# Patient Record
Sex: MALE | Race: WHITE | Employment: FULL TIME | URBAN - METROPOLITAN AREA
[De-identification: names, ages, dates, MRNs, and addresses within clinical notes are randomized per-mention and may not be internally consistent; named-entity substitution may affect disease eponyms.]

---

## 2017-09-12 ENCOUNTER — OFFICE VISIT (OUTPATIENT)
Dept: FAMILY MEDICINE CLINIC | Age: 47
End: 2017-09-12
Payer: COMMERCIAL

## 2017-09-12 VITALS
SYSTOLIC BLOOD PRESSURE: 116 MMHG | BODY MASS INDEX: 28.4 KG/M2 | HEART RATE: 78 BPM | RESPIRATION RATE: 16 BRPM | WEIGHT: 228.4 LBS | HEIGHT: 75 IN | DIASTOLIC BLOOD PRESSURE: 78 MMHG

## 2017-09-12 DIAGNOSIS — D22.9 MULTIPLE NEVI: ICD-10-CM

## 2017-09-12 DIAGNOSIS — Z00.00 ENCOUNTER FOR MEDICAL EXAMINATION TO ESTABLISH CARE: Primary | ICD-10-CM

## 2017-09-12 DIAGNOSIS — E78.00 PURE HYPERCHOLESTEROLEMIA: ICD-10-CM

## 2017-09-12 DIAGNOSIS — L82.1 SEBORRHEIC KERATOSES: ICD-10-CM

## 2017-09-12 PROCEDURE — 99203 OFFICE O/P NEW LOW 30 MIN: CPT | Performed by: INTERNAL MEDICINE

## 2017-09-12 ASSESSMENT — PATIENT HEALTH QUESTIONNAIRE - PHQ9
2. FEELING DOWN, DEPRESSED OR HOPELESS: 0
SUM OF ALL RESPONSES TO PHQ9 QUESTIONS 1 & 2: 0
1. LITTLE INTEREST OR PLEASURE IN DOING THINGS: 0
SUM OF ALL RESPONSES TO PHQ QUESTIONS 1-9: 0

## 2017-09-12 ASSESSMENT — ENCOUNTER SYMPTOMS
VOMITING: 0
CHEST TIGHTNESS: 0
DIARRHEA: 0
SHORTNESS OF BREATH: 0
COUGH: 0
ABDOMINAL PAIN: 0

## 2017-09-25 ENCOUNTER — HOSPITAL ENCOUNTER (OUTPATIENT)
Facility: CLINIC | Age: 47
Discharge: HOME | End: 2017-09-25
Payer: COMMERCIAL

## 2017-09-25 DIAGNOSIS — R73.01 ELEVATED FASTING GLUCOSE: Primary | ICD-10-CM

## 2017-09-25 DIAGNOSIS — E78.00 PURE HYPERCHOLESTEROLEMIA: Primary | ICD-10-CM

## 2017-09-25 DIAGNOSIS — Z00.00 ENCOUNTER FOR MEDICAL EXAMINATION TO ESTABLISH CARE: ICD-10-CM

## 2017-09-25 DIAGNOSIS — R73.01 ELEVATED FASTING BLOOD SUGAR: ICD-10-CM

## 2017-09-25 DIAGNOSIS — E78.00 PURE HYPERCHOLESTEROLEMIA: ICD-10-CM

## 2017-09-25 LAB
ALBUMIN SERPL-MCNC: 4.7 G/DL (ref 3.5–5.2)
ALBUMIN/GLOBULIN RATIO: 1.5 (ref 1–2.5)
ALP BLD-CCNC: 73 U/L (ref 40–129)
ALT SERPL-CCNC: 45 U/L (ref 5–41)
ANION GAP SERPL CALCULATED.3IONS-SCNC: 14 MMOL/L (ref 9–17)
AST SERPL-CCNC: 27 U/L
BILIRUB SERPL-MCNC: 0.47 MG/DL (ref 0.3–1.2)
BUN BLDV-MCNC: 17 MG/DL (ref 6–20)
BUN/CREAT BLD: ABNORMAL (ref 9–20)
CALCIUM SERPL-MCNC: 9.6 MG/DL (ref 8.6–10.4)
CHLORIDE BLD-SCNC: 103 MMOL/L (ref 98–107)
CHOLESTEROL/HDL RATIO: 5.6
CHOLESTEROL: 280 MG/DL
CO2: 23 MMOL/L (ref 20–31)
CREAT SERPL-MCNC: 1.02 MG/DL (ref 0.7–1.2)
ESTIMATED AVERAGE GLUCOSE: 108 MG/DL
GFR AFRICAN AMERICAN: >60 ML/MIN
GFR NON-AFRICAN AMERICAN: >60 ML/MIN
GFR SERPL CREATININE-BSD FRML MDRD: ABNORMAL ML/MIN/{1.73_M2}
GFR SERPL CREATININE-BSD FRML MDRD: ABNORMAL ML/MIN/{1.73_M2}
GLUCOSE BLD-MCNC: 112 MG/DL (ref 70–99)
HBA1C MFR BLD: 5.4 % (ref 4–6)
HCT VFR BLD CALC: 47 % (ref 41–53)
HDLC SERPL-MCNC: 50 MG/DL
HEMOGLOBIN: 15.8 G/DL (ref 13.5–17.5)
LDL CHOLESTEROL: 201 MG/DL (ref 0–130)
MCH RBC QN AUTO: 29.4 PG (ref 26–34)
MCHC RBC AUTO-ENTMCNC: 33.7 G/DL (ref 31–37)
MCV RBC AUTO: 87.2 FL (ref 80–100)
PDW BLD-RTO: 14.4 % (ref 12.5–15.4)
PLATELET # BLD: 248 K/UL (ref 140–450)
PMV BLD AUTO: 10.8 FL (ref 6–12)
POTASSIUM SERPL-SCNC: 4.4 MMOL/L (ref 3.7–5.3)
RBC # BLD: 5.39 M/UL (ref 4.5–5.9)
SODIUM BLD-SCNC: 140 MMOL/L (ref 135–144)
TOTAL PROTEIN: 7.9 G/DL (ref 6.4–8.3)
TRIGL SERPL-MCNC: 145 MG/DL
VLDLC SERPL CALC-MCNC: ABNORMAL MG/DL (ref 1–30)
WBC # BLD: 6.8 K/UL (ref 3.5–11)

## 2017-09-25 PROCEDURE — 80053 COMPREHEN METABOLIC PANEL: CPT

## 2017-09-25 PROCEDURE — 36415 COLL VENOUS BLD VENIPUNCTURE: CPT

## 2017-09-25 PROCEDURE — 83036 HEMOGLOBIN GLYCOSYLATED A1C: CPT

## 2017-09-25 PROCEDURE — 85027 COMPLETE CBC AUTOMATED: CPT

## 2017-09-25 PROCEDURE — 80061 LIPID PANEL: CPT

## 2017-09-25 RX ORDER — SIMVASTATIN 40 MG
40 TABLET ORAL NIGHTLY
Qty: 30 TABLET | Refills: 3 | Status: SHIPPED | OUTPATIENT
Start: 2017-09-25 | End: 2017-09-26 | Stop reason: ALTCHOICE

## 2017-09-25 RX ORDER — ASPIRIN 81 MG/1
81 TABLET ORAL DAILY
Qty: 30 TABLET | Refills: 3 | Status: SHIPPED | OUTPATIENT
Start: 2017-09-25

## 2017-09-26 DIAGNOSIS — E78.00 PURE HYPERCHOLESTEROLEMIA: Primary | ICD-10-CM

## 2017-09-26 RX ORDER — PRAVASTATIN SODIUM 40 MG
40 TABLET ORAL DAILY
Qty: 30 TABLET | Refills: 3 | Status: SHIPPED | OUTPATIENT
Start: 2017-09-26

## 2022-04-19 ENCOUNTER — OFFICE VISIT (OUTPATIENT)
Dept: PRIMARY CARE CLINIC | Age: 52
End: 2022-04-19
Payer: COMMERCIAL

## 2022-04-19 VITALS
SYSTOLIC BLOOD PRESSURE: 138 MMHG | TEMPERATURE: 97.1 F | OXYGEN SATURATION: 98 % | WEIGHT: 223 LBS | BODY MASS INDEX: 27.87 KG/M2 | DIASTOLIC BLOOD PRESSURE: 86 MMHG | HEART RATE: 76 BPM

## 2022-04-19 DIAGNOSIS — H66.001 NON-RECURRENT ACUTE SUPPURATIVE OTITIS MEDIA OF RIGHT EAR WITHOUT SPONTANEOUS RUPTURE OF TYMPANIC MEMBRANE: Primary | ICD-10-CM

## 2022-04-19 PROCEDURE — 99214 OFFICE O/P EST MOD 30 MIN: CPT | Performed by: FAMILY MEDICINE

## 2022-04-19 RX ORDER — FLUTICASONE PROPIONATE 50 MCG
2 SPRAY, SUSPENSION (ML) NASAL DAILY
Qty: 16 G | Refills: 0 | Status: SHIPPED | OUTPATIENT
Start: 2022-04-19

## 2022-04-19 NOTE — PROGRESS NOTES
Subjective:  Francisco Love presents for   Chief Complaint   Patient presents with    Otalgia     urgent care sunday; last week tues/weds sinus pressure then ear pain fri/sat; rx given for zpack and prednisone for Rt OM; pt is not feeling any relief. No fevers      No cough or nasal congestion    Flew down to [place] in the past week    After 2 days of antibioitcs, he thought he would be feeling better. Patient Active Problem List   Diagnosis    Hyperlipidemia         Review of Systems:  · General: no significant weight changes. · Respiratory: no cough, pleuritic chest pain, dyspnea, or wheezing  · Cardiovascular: no pain, MOYA, orthopnea, palpitations or claudication  · Gastrointestinal: no chronic nausea, vomiting, heartburn, diarrhea, constipation, bloating, or abdominal pain. No bloody or black stools. Objective:  Physical Exam   Vitals: Wt Readings from Last 3 Encounters:   04/19/22 223 lb (101.2 kg)   09/12/17 228 lb 6.4 oz (103.6 kg)   01/17/13 215 lb (97.5 kg)     Ht Readings from Last 3 Encounters:   09/12/17 6' 3\" (1.905 m)   01/17/13 6' 3\" (1.905 m)     Body mass index is 27.87 kg/m². Vitals:    04/19/22 1029   BP: 138/86   Site: Left Upper Arm   Pulse: 76   Temp: 97.1 °F (36.2 °C)   SpO2: 98%   Weight: 223 lb (101.2 kg)       Constitutional: He is oriented to person, place, and time. He appears well-developed and well-nourished and in no acute distress. Answers all my questions appropriately. Head: Normocephalic and atraumatic. Eyes:conjunctiva appear normal.    Right Ear: External ear normal. TM is red with slight distortion  Left Ear: External ear normal. TM is clear    Nose: pink, non-edematous mucosa. No polyps. No septal deviation    Throat: no erythema, tonsillar hypertrophy or exudate. No ulcerations noted. Lips/Teeth/Gums all appear normal.    Neck: Normal range of motion. Neck supple. No tracheal deviation present. No abnormal lymphadenopathy. No JVD noted. Carotids are clear bilaterally. No thyroid masses noted. Heart: RRR without murmur. No S3, S4, or gallop noted. Chest:   Good breath sounds noted. Clear to auscultation bilaterally. No rales, wheezes, or rhonchi noted. No respiratory retractions noted. Wall has symmetrical movement with respirations. Assessment:   Encounter Diagnosis   Name Primary?  Non-recurrent acute suppurative otitis media of right ear without spontaneous rupture of tympanic membrane Yes         Plan:   Finish the med. Explained to him that zpak is actually a 10 day antibiotic even thogh he takes it for only 5. Push hot fluids, motrin, mccabe's lozenges    There are no discontinued medications. THE ABOVE NOTED DISCONTINUED MEDS MAY ONLY BE FROM 'CLEANING UP' THE MED LIST AND WERE NOT ACTUALLY CANCELED;  SEE CHART FOR DETAILS! Orders Placed This Encounter   Medications    fluticasone (FLONASE) 50 MCG/ACT nasal spray     Si sprays by Nasal route daily     Dispense:  16 g     Refill:  0     No orders of the defined types were placed in this encounter. No follow-ups on file. There are no Patient Instructions on file for this visit.   Data Unavailable      Follow up with provider as needed